# Patient Record
Sex: MALE | Race: WHITE
[De-identification: names, ages, dates, MRNs, and addresses within clinical notes are randomized per-mention and may not be internally consistent; named-entity substitution may affect disease eponyms.]

---

## 2020-03-29 ENCOUNTER — HOSPITAL ENCOUNTER (EMERGENCY)
Dept: HOSPITAL 88 - ER | Age: 40
Discharge: HOME | End: 2020-03-29
Payer: COMMERCIAL

## 2020-03-29 VITALS — BODY MASS INDEX: 23.1 KG/M2 | WEIGHT: 165 LBS | HEIGHT: 71 IN

## 2020-03-29 DIAGNOSIS — R19.7: ICD-10-CM

## 2020-03-29 DIAGNOSIS — R10.84: Primary | ICD-10-CM

## 2020-03-29 LAB
ALBUMIN SERPL-MCNC: 3.7 G/DL (ref 3.5–5)
ALBUMIN/GLOB SERPL: 1.6 {RATIO} (ref 0.8–2)
ALP SERPL-CCNC: 41 IU/L (ref 40–150)
ALT SERPL-CCNC: 24 IU/L (ref 0–55)
ANION GAP SERPL CALC-SCNC: 9 MMOL/L (ref 8–16)
BACTERIA URNS QL MICRO: (no result) /HPF
BASOPHILS # BLD AUTO: 0 10*3/UL (ref 0–0.1)
BASOPHILS NFR BLD AUTO: 0.3 % (ref 0–1)
BILIRUB UR QL: (no result)
BUN SERPL-MCNC: 11 MG/DL (ref 7–26)
BUN/CREAT SERPL: 9 (ref 6–25)
CALCIUM SERPL-MCNC: 8.8 MG/DL (ref 8.4–10.2)
CHLORIDE SERPL-SCNC: 111 MMOL/L (ref 98–107)
CK MB SERPL-MCNC: 0.5 NG/ML (ref 0–5)
CK SERPL-CCNC: 49 IU/L (ref 30–200)
CLARITY UR: (no result)
CO2 SERPL-SCNC: 28 MMOL/L (ref 22–29)
COLOR UR: (no result)
DEPRECATED APTT PLAS QN: 27.9 SECONDS (ref 23.8–35.5)
DEPRECATED INR PLAS: 1.11
DEPRECATED NEUTROPHILS # BLD AUTO: 9.4 10*3/UL (ref 2.1–6.9)
DEPRECATED RBC URNS MANUAL-ACNC: (no result) /HPF (ref 0–5)
EGFRCR SERPLBLD CKD-EPI 2021: > 60 ML/MIN (ref 60–?)
EOSINOPHIL # BLD AUTO: 0.1 10*3/UL (ref 0–0.4)
EOSINOPHIL NFR BLD AUTO: 0.7 % (ref 0–6)
EPI CELLS URNS QL MICRO: (no result) /LPF
ERYTHROCYTE [DISTWIDTH] IN CORD BLOOD: 13.2 % (ref 11.7–14.4)
GLOBULIN PLAS-MCNC: 2.3 G/DL (ref 2.3–3.5)
GLUCOSE SERPLBLD-MCNC: 92 MG/DL (ref 74–118)
HCT VFR BLD AUTO: 31.2 % (ref 38.2–49.6)
HGB BLD-MCNC: 10.4 G/DL (ref 14–18)
KETONES UR QL STRIP.AUTO: (no result)
LEUKOCYTE ESTERASE UR QL STRIP.AUTO: NEGATIVE
LIPASE SERPL-CCNC: 19 U/L (ref 8–78)
LYMPHOCYTES # BLD: 1.4 10*3/UL (ref 1–3.2)
LYMPHOCYTES NFR BLD AUTO: 11.9 % (ref 18–39.1)
MCH RBC QN AUTO: 31.4 PG (ref 28–32)
MCHC RBC AUTO-ENTMCNC: 33.3 G/DL (ref 31–35)
MCV RBC AUTO: 94.3 FL (ref 81–99)
MONOCYTES # BLD AUTO: 1.1 10*3/UL (ref 0.2–0.8)
MONOCYTES NFR BLD AUTO: 9.1 % (ref 4.4–11.3)
NEUTS SEG NFR BLD AUTO: 77.8 % (ref 38.7–80)
NITRITE UR QL STRIP.AUTO: NEGATIVE
PLATELET # BLD AUTO: 221 X10E3/UL (ref 140–360)
POTASSIUM SERPL-SCNC: 4 MMOL/L (ref 3.5–5.1)
PROT UR QL STRIP.AUTO: (no result)
PROTHROMBIN TIME: 15 SECONDS (ref 11.9–14.5)
RBC # BLD AUTO: 3.31 X10E6/UL (ref 4.3–5.7)
SODIUM SERPL-SCNC: 144 MMOL/L (ref 136–145)
SP GR UR STRIP: 1.03 (ref 1.01–1.02)
UROBILINOGEN UR STRIP-MCNC: 0.2 MG/DL (ref 0.2–1)
WBC #/AREA URNS HPF: (no result) /HPF (ref 0–5)

## 2020-03-29 PROCEDURE — 36415 COLL VENOUS BLD VENIPUNCTURE: CPT

## 2020-03-29 PROCEDURE — 85730 THROMBOPLASTIN TIME PARTIAL: CPT

## 2020-03-29 PROCEDURE — 81001 URINALYSIS AUTO W/SCOPE: CPT

## 2020-03-29 PROCEDURE — 85025 COMPLETE CBC W/AUTO DIFF WBC: CPT

## 2020-03-29 PROCEDURE — 83690 ASSAY OF LIPASE: CPT

## 2020-03-29 PROCEDURE — 85610 PROTHROMBIN TIME: CPT

## 2020-03-29 PROCEDURE — 99284 EMERGENCY DEPT VISIT MOD MDM: CPT

## 2020-03-29 PROCEDURE — 80053 COMPREHEN METABOLIC PANEL: CPT

## 2020-03-29 PROCEDURE — 82553 CREATINE MB FRACTION: CPT

## 2020-03-29 PROCEDURE — 82550 ASSAY OF CK (CPK): CPT

## 2020-03-29 PROCEDURE — 84484 ASSAY OF TROPONIN QUANT: CPT

## 2020-04-13 ENCOUNTER — HOSPITAL ENCOUNTER (OUTPATIENT)
Dept: HOSPITAL 88 - OR | Age: 40
Discharge: HOME | End: 2020-04-13
Attending: INTERNAL MEDICINE
Payer: COMMERCIAL

## 2020-04-13 VITALS — DIASTOLIC BLOOD PRESSURE: 77 MMHG | SYSTOLIC BLOOD PRESSURE: 103 MMHG

## 2020-04-13 DIAGNOSIS — F17.210: ICD-10-CM

## 2020-04-13 DIAGNOSIS — K29.80: ICD-10-CM

## 2020-04-13 DIAGNOSIS — G47.33: ICD-10-CM

## 2020-04-13 DIAGNOSIS — K51.50: ICD-10-CM

## 2020-04-13 DIAGNOSIS — F41.9: ICD-10-CM

## 2020-04-13 DIAGNOSIS — D64.9: ICD-10-CM

## 2020-04-13 DIAGNOSIS — K20.9: ICD-10-CM

## 2020-04-13 DIAGNOSIS — K29.70: Primary | ICD-10-CM

## 2020-04-13 DIAGNOSIS — K64.8: ICD-10-CM

## 2020-04-13 DIAGNOSIS — K62.1: ICD-10-CM

## 2020-04-13 DIAGNOSIS — K57.30: ICD-10-CM

## 2020-04-13 DIAGNOSIS — K63.5: ICD-10-CM

## 2020-04-13 DIAGNOSIS — F32.9: ICD-10-CM

## 2020-04-13 LAB
C DIFFICILE TOXIN A&B AMP PROB: NEGATIVE
LACTOFERRIN STL QL: NEGATIVE

## 2020-04-13 PROCEDURE — 87493 C DIFF AMPLIFIED PROBE: CPT

## 2020-04-13 PROCEDURE — 45380 COLONOSCOPY AND BIOPSY: CPT

## 2020-04-13 PROCEDURE — 45384 COLONOSCOPY W/LESION REMOVAL: CPT

## 2020-04-13 PROCEDURE — 45385 COLONOSCOPY W/LESION REMOVAL: CPT

## 2020-04-13 PROCEDURE — 87045 FECES CULTURE AEROBIC BACT: CPT

## 2020-04-13 PROCEDURE — 87328 CRYPTOSPORIDIUM AG IA: CPT

## 2020-04-13 PROCEDURE — 87177 OVA AND PARASITES SMEARS: CPT

## 2020-04-13 PROCEDURE — 83993 ASSAY FOR CALPROTECTIN FECAL: CPT

## 2020-04-13 PROCEDURE — 93005 ELECTROCARDIOGRAM TRACING: CPT

## 2020-04-13 PROCEDURE — 83630 LACTOFERRIN FECAL (QUAL): CPT

## 2020-04-13 PROCEDURE — 43239 EGD BIOPSY SINGLE/MULTIPLE: CPT

## 2020-04-13 NOTE — OPERATIVE REPORT
DATE OF PROCEDURE:  04/13/2020

 

SURGEON:  Beto Poon MD

 

PROCEDURE:  EGD with biopsies and colonoscopy with polypectomy and biopsies.

 

INDICATIONS FOR EGD:  Nausea, dark stools, and anemia.

 

INDICATIONS FOR COLONOSCOPY:  Rectal bleeding, lower abdominal pain, diarrhea, abnormal

CT scan of the colon, repeat abnormal CT scan of the abdomen. 

 

MEDICATIONS:  The patient was done under MAC, please see anesthesiologist's note.

 

PROCEDURE IN DETAIL:  With the patient in left lateral decubitus position, a flexible

fiberoptic Olympus gastroscope was introduced into the esophagus under direct

visualization without any difficulty.  There was some patchy erythema noted in distal

esophagus.  The scope was then advanced with ease into the stomach.  Mucosa overlying

the antrum and the body revealed some patchy erythema and low-grade to moderate edema,

and biopsies were obtained and sent to stain for H pylori.  The pylorus was of normal

contour and shape and was intubated with ease and the scope was advanced all the way to

the second portion of the duodenum.  Biopsies were obtained from the proximal second

portion and duodenal bulb to rule out sprue.  The scope was then withdrawn back into the

stomach and retroflexed mucosa overlying the fundus and cardia appeared to be within

normal limits.  The scope was then straightened out, it was subsequently withdrawn, the

patient tolerated the procedure well. 

 

IMPRESSION:  

1. Distal esophagitis.

2. Gastritis, biopsied, biopsies sent to stain for H pylori.

3. Rule out sprue.

 

PLAN:  Follow up histology.  Initiate Protonix 40 mg one p.o. q.a.m. a.c.

 

PROCEDURE FOR COLONOSCOPY:  The patient was then turned around after adequate

lubrication of the anal canal.  The flexible fiberoptic Olympus colonoscope was inserted

into the rectum with ease and advanced all the way to the cecum.  Mucosa overlying the

cecum appeared to be within normal limits.  The ileocecal valve was intubated and the

scope was advanced into the terminal ileum.  Biopsies were obtained.  The scope was then

withdrawn back into the colon.  Of note, diverticular disease was noted throughout the

colon.  The scope was then withdrawn slowly and mucosa overlying the ascending and the

transverse colon grossly appeared to be within normal limits other than for diverticular

disease.  There were some patchy mild inflammatory changes noted in the left colon and

multiple random biopsies were obtained.  Approximately 8 mm sessile polyp was removed

per snare electrocautery from the sigmoid colon and minute polyp was hot biopsied from

the rectum.  The scope was then retroflexed into the distal rectum and moderate-sized

internal hemorrhoids were noted, none of which was actively bleeding.  The scope was

then straightened out it was subsequently withdrawn.  The patient tolerated the

procedure well. 

 

IMPRESSION:  

1. Pandiverticulosis.

2. Mild patchy left-sided colitis.

3. Sigmoid colon polyp approximately 8 mm in size, sessile, removed per snare

electrocautery. 

4. Rectal polyp, hot biopsied.

5. Internal hemorrhoids, none actively bleeding.

 

PLAN:  Follow up histology.  Follow up stool studies.  Initiate Bentyl 10 mg one p.o.

t.i.d.  VSL #3 one p.o. b.i.d.  The patient might benefit from a followup colonoscopy in

3 years. 

 

 

 

 

______________________________

Beto Poon MD

 

[facility]/[place]

D:  04/13/2020 09:45:29

T:  04/13/2020 10:36:45

Job #:  [id number]

 

cc:            Gatito Rosario